# Patient Record
Sex: FEMALE | Race: BLACK OR AFRICAN AMERICAN | Employment: FULL TIME | URBAN - METROPOLITAN AREA
[De-identification: names, ages, dates, MRNs, and addresses within clinical notes are randomized per-mention and may not be internally consistent; named-entity substitution may affect disease eponyms.]

---

## 2020-07-17 ENCOUNTER — APPOINTMENT (OUTPATIENT)
Dept: GENERAL RADIOLOGY | Age: 60
DRG: 291 | End: 2020-07-17
Attending: EMERGENCY MEDICINE
Payer: COMMERCIAL

## 2020-07-17 ENCOUNTER — HOSPITAL ENCOUNTER (INPATIENT)
Age: 60
LOS: 1 days | Discharge: HOME | DRG: 291 | End: 2020-07-18
Attending: EMERGENCY MEDICINE | Admitting: FAMILY MEDICINE
Payer: COMMERCIAL

## 2020-07-17 ENCOUNTER — APPOINTMENT (OUTPATIENT)
Dept: NON INVASIVE DIAGNOSTICS | Age: 60
DRG: 291 | End: 2020-07-17
Attending: FAMILY MEDICINE
Payer: COMMERCIAL

## 2020-07-17 DIAGNOSIS — J96.01 ACUTE RESPIRATORY FAILURE WITH HYPOXIA (HCC): ICD-10-CM

## 2020-07-17 DIAGNOSIS — I50.9 ACUTE ON CHRONIC CONGESTIVE HEART FAILURE, UNSPECIFIED HEART FAILURE TYPE (HCC): Primary | ICD-10-CM

## 2020-07-17 DIAGNOSIS — R06.03 RESPIRATORY DISTRESS, ACUTE: ICD-10-CM

## 2020-07-17 DIAGNOSIS — J81.0 FLASH PULMONARY EDEMA (HCC): ICD-10-CM

## 2020-07-17 DIAGNOSIS — I16.1 HYPERTENSIVE EMERGENCY: ICD-10-CM

## 2020-07-17 PROBLEM — J81.1 PULMONARY EDEMA: Status: ACTIVE | Noted: 2020-07-17

## 2020-07-17 LAB
ADMINISTERED INITIALS, ADMINIT: NORMAL
ALBUMIN SERPL-MCNC: 3 G/DL (ref 3.5–5)
ALBUMIN/GLOB SERPL: 0.6 {RATIO} (ref 1.1–2.2)
ALP SERPL-CCNC: 120 U/L (ref 45–117)
ALT SERPL-CCNC: 24 U/L (ref 12–78)
ANION GAP SERPL CALC-SCNC: 10 MMOL/L (ref 5–15)
ANION GAP SERPL CALC-SCNC: 7 MMOL/L (ref 5–15)
APPEARANCE UR: CLEAR
AST SERPL-CCNC: 24 U/L (ref 15–37)
ATRIAL RATE: 121 BPM
BACTERIA URNS QL MICRO: ABNORMAL /HPF
BASOPHILS # BLD: 0.1 K/UL (ref 0–0.1)
BASOPHILS NFR BLD: 0 % (ref 0–1)
BILIRUB SERPL-MCNC: 0.2 MG/DL (ref 0.2–1)
BILIRUB UR QL: NEGATIVE
BNP SERPL-MCNC: 2600 PG/ML
BUN SERPL-MCNC: 24 MG/DL (ref 6–20)
BUN SERPL-MCNC: 24 MG/DL (ref 6–20)
BUN/CREAT SERPL: 12 (ref 12–20)
BUN/CREAT SERPL: 12 (ref 12–20)
CALCIUM SERPL-MCNC: 8.5 MG/DL (ref 8.5–10.1)
CALCIUM SERPL-MCNC: 8.9 MG/DL (ref 8.5–10.1)
CALCULATED P AXIS, ECG09: 73 DEGREES
CALCULATED R AXIS, ECG10: 56 DEGREES
CALCULATED T AXIS, ECG11: 79 DEGREES
CHLORIDE SERPL-SCNC: 108 MMOL/L (ref 97–108)
CHLORIDE SERPL-SCNC: 108 MMOL/L (ref 97–108)
CK MB CFR SERPL CALC: 0.3 % (ref 0–2.5)
CK MB SERPL-MCNC: 2.2 NG/ML (ref 5–25)
CK SERPL-CCNC: 767 U/L (ref 26–192)
CO2 SERPL-SCNC: 25 MMOL/L (ref 21–32)
CO2 SERPL-SCNC: 25 MMOL/L (ref 21–32)
COLOR UR: ABNORMAL
CREAT SERPL-MCNC: 1.97 MG/DL (ref 0.55–1.02)
CREAT SERPL-MCNC: 1.97 MG/DL (ref 0.55–1.02)
D50 ADMINISTERED, D50ADM: 0 ML
D50 ORDER, D50ORD: 0 ML
DIAGNOSIS, 93000: NORMAL
DIFFERENTIAL METHOD BLD: ABNORMAL
ECHO AV AREA PEAK VELOCITY: 2.34 CM2
ECHO AV AREA/BSA PEAK VELOCITY: 1.1 CM2/M2
ECHO AV PEAK GRADIENT: 4.39 MMHG
ECHO AV PEAK VELOCITY: 104.76 CM/S
ECHO EST RA PRESSURE: 10 MMHG
ECHO LA AREA 4C: 22.45 CM2
ECHO LA MAJOR AXIS: 2.73 CM
ECHO LA MINOR AXIS: 1.29 CM
ECHO LA TO AORTIC ROOT RATIO: 1.75
ECHO LA VOL 4C: 72.45 ML (ref 22–52)
ECHO LA VOLUME INDEX A4C: 34.27 ML/M2 (ref 16–28)
ECHO LV E' LATERAL VELOCITY: 8.19 CM/S
ECHO LV E' SEPTAL VELOCITY: 6.85 CM/S
ECHO LV INTERNAL DIMENSION DIASTOLIC MMODE: 5.56 CM
ECHO LV INTERNAL DIMENSION SYSTOLIC MMODE: 3.43 CM
ECHO LV IVSD MMODE: 1.5 CM
ECHO LV IVSS MMODE: 2.13 CM
ECHO LV POSTERIOR WALL DIASTOLIC MMODE: 1.3 CM
ECHO LV POSTERIOR WALL SYSTOLIC MMODE: 1.87 CM
ECHO LVOT DIAM: 1.98 CM
ECHO LVOT PEAK GRADIENT: 2.54 MMHG
ECHO LVOT PEAK VELOCITY: 79.69 CM/S
ECHO MV A VELOCITY: 78.47 CM/S
ECHO MV E DECELERATION TIME (DT): 0.15 S
ECHO MV E VELOCITY: 106.72 CM/S
ECHO MV E/A RATIO: 1.36
ECHO MV E/E' LATERAL: 13.03
ECHO MV E/E' RATIO (AVERAGED): 14.31
ECHO MV E/E' SEPTAL: 15.58
ECHO PV PEAK INSTANTANEOUS GRADIENT SYSTOLIC: 2.51 MMHG
ECHO PV REGURGITANT MAX VELOCITY: 168.26 CM/S
ECHO PV REGURGITANT MAX VELOCITY: 409.8 CM/S
ECHO PV REGURGITANT MAX VELOCITY: 79.26 CM/S
ECHO RIGHT VENTRICULAR SYSTOLIC PRESSURE (RVSP): 35.27 MMHG
ECHO RV INTERNAL DIMENSION: 2.94 CM
ECHO TV REGURGITANT MAX VELOCITY: 251.21 CM/S
ECHO TV REGURGITANT PEAK GRADIENT: 25.27 MMHG
EOSINOPHIL # BLD: 0.2 K/UL (ref 0–0.4)
EOSINOPHIL NFR BLD: 2 % (ref 0–7)
EPITH CASTS URNS QL MICRO: ABNORMAL /LPF
ERYTHROCYTE [DISTWIDTH] IN BLOOD BY AUTOMATED COUNT: 12.7 % (ref 11.5–14.5)
EST. AVERAGE GLUCOSE BLD GHB EST-MCNC: 177 MG/DL
GLOBULIN SER CALC-MCNC: 5 G/DL (ref 2–4)
GLSCOM COMMENTS: NORMAL
GLUCOSE BLD STRIP.AUTO-MCNC: 138 MG/DL (ref 65–100)
GLUCOSE BLD STRIP.AUTO-MCNC: 214 MG/DL (ref 65–100)
GLUCOSE BLD STRIP.AUTO-MCNC: 254 MG/DL (ref 65–100)
GLUCOSE BLD STRIP.AUTO-MCNC: 487 MG/DL (ref 65–100)
GLUCOSE BLD STRIP.AUTO-MCNC: >600 MG/DL (ref 65–100)
GLUCOSE SERPL-MCNC: 196 MG/DL (ref 65–100)
GLUCOSE SERPL-MCNC: 221 MG/DL (ref 65–100)
GLUCOSE UR STRIP.AUTO-MCNC: 100 MG/DL
GLUCOSE, GLC: 254 MG/DL
HBA1C MFR BLD: 7.8 % (ref 4–5.6)
HCT VFR BLD AUTO: 37.9 % (ref 35–47)
HGB BLD-MCNC: 11.9 G/DL (ref 11.5–16)
HGB UR QL STRIP: ABNORMAL
HIGH TARGET, HITG: 250 MG/DL
HYALINE CASTS URNS QL MICRO: ABNORMAL /LPF (ref 0–5)
IMM GRANULOCYTES # BLD AUTO: 0 K/UL (ref 0–0.04)
IMM GRANULOCYTES NFR BLD AUTO: 0 % (ref 0–0.5)
INSULIN ADMINSTERED, INSADM: 3.9 UNITS/HOUR
INSULIN ORDER, INSORD: 3.9 UNITS/HOUR
KETONES UR QL STRIP.AUTO: NEGATIVE MG/DL
LEUKOCYTE ESTERASE UR QL STRIP.AUTO: NEGATIVE
LOW TARGET, LOT: 150 MG/DL
LYMPHOCYTES # BLD: 4.1 K/UL (ref 0.8–3.5)
LYMPHOCYTES NFR BLD: 36 % (ref 12–49)
MAGNESIUM SERPL-MCNC: 2.2 MG/DL (ref 1.6–2.4)
MCH RBC QN AUTO: 27.7 PG (ref 26–34)
MCHC RBC AUTO-ENTMCNC: 31.4 G/DL (ref 30–36.5)
MCV RBC AUTO: 88.3 FL (ref 80–99)
MINUTES UNTIL NEXT BG, NBG: 60 MIN
MONOCYTES # BLD: 0.5 K/UL (ref 0–1)
MONOCYTES NFR BLD: 4 % (ref 5–13)
MULTIPLIER, MUL: 0.02
NEUTS SEG # BLD: 6.7 K/UL (ref 1.8–8)
NEUTS SEG NFR BLD: 58 % (ref 32–75)
NITRITE UR QL STRIP.AUTO: NEGATIVE
NRBC # BLD: 0 K/UL (ref 0–0.01)
NRBC BLD-RTO: 0 PER 100 WBC
ORDER INITIALS, ORDINIT: NORMAL
P-R INTERVAL, ECG05: 148 MS
PH UR STRIP: 6 [PH] (ref 5–8)
PHOSPHATE SERPL-MCNC: 3.4 MG/DL (ref 2.6–4.7)
PLATELET # BLD AUTO: 302 K/UL (ref 150–400)
PMV BLD AUTO: 10 FL (ref 8.9–12.9)
POTASSIUM SERPL-SCNC: 3.7 MMOL/L (ref 3.5–5.1)
POTASSIUM SERPL-SCNC: 4.3 MMOL/L (ref 3.5–5.1)
PROT SERPL-MCNC: 8 G/DL (ref 6.4–8.2)
PROT UR STRIP-MCNC: 100 MG/DL
Q-T INTERVAL, ECG07: 322 MS
QRS DURATION, ECG06: 82 MS
QTC CALCULATION (BEZET), ECG08: 457 MS
RBC # BLD AUTO: 4.29 M/UL (ref 3.8–5.2)
RBC #/AREA URNS HPF: ABNORMAL /HPF (ref 0–5)
SERVICE CMNT-IMP: ABNORMAL
SODIUM SERPL-SCNC: 140 MMOL/L (ref 136–145)
SODIUM SERPL-SCNC: 143 MMOL/L (ref 136–145)
SP GR UR REFRACTOMETRY: 1.01 (ref 1–1.03)
TROPONIN I SERPL-MCNC: <0.05 NG/ML
TROPONIN I SERPL-MCNC: <0.05 NG/ML
UA: UC IF INDICATED,UAUC: ABNORMAL
UROBILINOGEN UR QL STRIP.AUTO: 0.2 EU/DL (ref 0.2–1)
VENTRICULAR RATE, ECG03: 121 BPM
WBC # BLD AUTO: 11.6 K/UL (ref 3.6–11)
WBC URNS QL MICRO: ABNORMAL /HPF (ref 0–4)

## 2020-07-17 PROCEDURE — 74011000258 HC RX REV CODE- 258: Performed by: FAMILY MEDICINE

## 2020-07-17 PROCEDURE — 93306 TTE W/DOPPLER COMPLETE: CPT

## 2020-07-17 PROCEDURE — 80053 COMPREHEN METABOLIC PANEL: CPT

## 2020-07-17 PROCEDURE — 74011250636 HC RX REV CODE- 250/636: Performed by: FAMILY MEDICINE

## 2020-07-17 PROCEDURE — 99285 EMERGENCY DEPT VISIT HI MDM: CPT

## 2020-07-17 PROCEDURE — 82550 ASSAY OF CK (CPK): CPT

## 2020-07-17 PROCEDURE — 77010033678 HC OXYGEN DAILY

## 2020-07-17 PROCEDURE — 96366 THER/PROPH/DIAG IV INF ADDON: CPT

## 2020-07-17 PROCEDURE — 93005 ELECTROCARDIOGRAM TRACING: CPT

## 2020-07-17 PROCEDURE — 74011000250 HC RX REV CODE- 250: Performed by: EMERGENCY MEDICINE

## 2020-07-17 PROCEDURE — 83735 ASSAY OF MAGNESIUM: CPT

## 2020-07-17 PROCEDURE — 74011636637 HC RX REV CODE- 636/637: Performed by: FAMILY MEDICINE

## 2020-07-17 PROCEDURE — 36415 COLL VENOUS BLD VENIPUNCTURE: CPT

## 2020-07-17 PROCEDURE — 81001 URINALYSIS AUTO W/SCOPE: CPT

## 2020-07-17 PROCEDURE — 74011250637 HC RX REV CODE- 250/637: Performed by: FAMILY MEDICINE

## 2020-07-17 PROCEDURE — 96365 THER/PROPH/DIAG IV INF INIT: CPT

## 2020-07-17 PROCEDURE — 5A09357 ASSISTANCE WITH RESPIRATORY VENTILATION, LESS THAN 24 CONSECUTIVE HOURS, CONTINUOUS POSITIVE AIRWAY PRESSURE: ICD-10-PCS | Performed by: EMERGENCY MEDICINE

## 2020-07-17 PROCEDURE — 82553 CREATINE MB FRACTION: CPT

## 2020-07-17 PROCEDURE — 84100 ASSAY OF PHOSPHORUS: CPT

## 2020-07-17 PROCEDURE — 83880 ASSAY OF NATRIURETIC PEPTIDE: CPT

## 2020-07-17 PROCEDURE — 85025 COMPLETE CBC W/AUTO DIFF WBC: CPT

## 2020-07-17 PROCEDURE — 84484 ASSAY OF TROPONIN QUANT: CPT

## 2020-07-17 PROCEDURE — 82962 GLUCOSE BLOOD TEST: CPT

## 2020-07-17 PROCEDURE — 65660000001 HC RM ICU INTERMED STEPDOWN

## 2020-07-17 PROCEDURE — 74011250636 HC RX REV CODE- 250/636

## 2020-07-17 PROCEDURE — 71045 X-RAY EXAM CHEST 1 VIEW: CPT

## 2020-07-17 PROCEDURE — 83036 HEMOGLOBIN GLYCOSYLATED A1C: CPT

## 2020-07-17 PROCEDURE — 96375 TX/PRO/DX INJ NEW DRUG ADDON: CPT

## 2020-07-17 RX ORDER — HEPARIN SODIUM 5000 [USP'U]/ML
7500 INJECTION, SOLUTION INTRAVENOUS; SUBCUTANEOUS EVERY 8 HOURS
Status: DISCONTINUED | OUTPATIENT
Start: 2020-07-17 | End: 2020-07-18 | Stop reason: HOSPADM

## 2020-07-17 RX ORDER — GLIPIZIDE 5 MG/1
5 TABLET ORAL 2 TIMES DAILY
COMMUNITY
End: 2020-07-17 | Stop reason: DRUGHIGH

## 2020-07-17 RX ORDER — ACETAMINOPHEN 325 MG/1
650 TABLET ORAL
Status: DISCONTINUED | OUTPATIENT
Start: 2020-07-17 | End: 2020-07-18 | Stop reason: HOSPADM

## 2020-07-17 RX ORDER — ACETAMINOPHEN 325 MG/1
650 TABLET ORAL
Status: DISCONTINUED | OUTPATIENT
Start: 2020-07-17 | End: 2020-07-17 | Stop reason: SDUPTHER

## 2020-07-17 RX ORDER — SODIUM CHLORIDE 0.9 % (FLUSH) 0.9 %
5-40 SYRINGE (ML) INJECTION AS NEEDED
Status: DISCONTINUED | OUTPATIENT
Start: 2020-07-17 | End: 2020-07-18 | Stop reason: HOSPADM

## 2020-07-17 RX ORDER — MAGNESIUM SULFATE 100 %
4 CRYSTALS MISCELLANEOUS AS NEEDED
Status: DISCONTINUED | OUTPATIENT
Start: 2020-07-17 | End: 2020-07-17

## 2020-07-17 RX ORDER — FUROSEMIDE 10 MG/ML
40 INJECTION INTRAMUSCULAR; INTRAVENOUS 2 TIMES DAILY
Status: DISCONTINUED | OUTPATIENT
Start: 2020-07-17 | End: 2020-07-17

## 2020-07-17 RX ORDER — ACETAMINOPHEN 325 MG/1
650 TABLET ORAL
Status: DISCONTINUED | OUTPATIENT
Start: 2020-07-17 | End: 2020-07-17

## 2020-07-17 RX ORDER — SODIUM CHLORIDE 0.9 % (FLUSH) 0.9 %
5-40 SYRINGE (ML) INJECTION EVERY 8 HOURS
Status: DISCONTINUED | OUTPATIENT
Start: 2020-07-17 | End: 2020-07-18 | Stop reason: HOSPADM

## 2020-07-17 RX ORDER — LISINOPRIL 5 MG/1
10 TABLET ORAL DAILY
Status: DISCONTINUED | OUTPATIENT
Start: 2020-07-17 | End: 2020-07-18 | Stop reason: HOSPADM

## 2020-07-17 RX ORDER — DEXTROSE 50 % IN WATER (D50W) INTRAVENOUS SYRINGE
25-50 AS NEEDED
Status: DISCONTINUED | OUTPATIENT
Start: 2020-07-17 | End: 2020-07-17 | Stop reason: SDUPTHER

## 2020-07-17 RX ORDER — GLIPIZIDE 10 MG/1
10 TABLET ORAL 2 TIMES DAILY
COMMUNITY

## 2020-07-17 RX ORDER — INSULIN LISPRO 100 [IU]/ML
INJECTION, SOLUTION INTRAVENOUS; SUBCUTANEOUS
Status: DISCONTINUED | OUTPATIENT
Start: 2020-07-17 | End: 2020-07-17

## 2020-07-17 RX ORDER — MAGNESIUM SULFATE 100 %
4 CRYSTALS MISCELLANEOUS AS NEEDED
Status: DISCONTINUED | OUTPATIENT
Start: 2020-07-17 | End: 2020-07-17 | Stop reason: SDUPTHER

## 2020-07-17 RX ORDER — FUROSEMIDE 10 MG/ML
40 INJECTION INTRAMUSCULAR; INTRAVENOUS
Status: COMPLETED | OUTPATIENT
Start: 2020-07-17 | End: 2020-07-17

## 2020-07-17 RX ORDER — DEXTROSE 50 % IN WATER (D50W) INTRAVENOUS SYRINGE
25-50 AS NEEDED
Status: DISCONTINUED | OUTPATIENT
Start: 2020-07-17 | End: 2020-07-17

## 2020-07-17 RX ORDER — FUROSEMIDE 10 MG/ML
INJECTION INTRAMUSCULAR; INTRAVENOUS
Status: COMPLETED
Start: 2020-07-17 | End: 2020-07-17

## 2020-07-17 RX ORDER — NITROGLYCERIN 20 MG/100ML
0-200 INJECTION INTRAVENOUS
Status: DISCONTINUED | OUTPATIENT
Start: 2020-07-17 | End: 2020-07-17

## 2020-07-17 RX ORDER — SPIRONOLACTONE 25 MG/1
25 TABLET ORAL DAILY
COMMUNITY

## 2020-07-17 RX ORDER — INSULIN LISPRO 100 [IU]/ML
INJECTION, SOLUTION INTRAVENOUS; SUBCUTANEOUS
Status: DISCONTINUED | OUTPATIENT
Start: 2020-07-17 | End: 2020-07-18 | Stop reason: HOSPADM

## 2020-07-17 RX ORDER — CARVEDILOL 6.25 MG/1
6.25 TABLET ORAL 2 TIMES DAILY WITH MEALS
COMMUNITY

## 2020-07-17 RX ORDER — LISINOPRIL 40 MG/1
40 TABLET ORAL DAILY
COMMUNITY

## 2020-07-17 RX ORDER — CARVEDILOL 12.5 MG/1
25 TABLET ORAL 2 TIMES DAILY WITH MEALS
Status: DISCONTINUED | OUTPATIENT
Start: 2020-07-17 | End: 2020-07-18 | Stop reason: HOSPADM

## 2020-07-17 RX ORDER — SPIRONOLACTONE 25 MG/1
25 TABLET ORAL DAILY
Status: DISCONTINUED | OUTPATIENT
Start: 2020-07-17 | End: 2020-07-18 | Stop reason: HOSPADM

## 2020-07-17 RX ORDER — FUROSEMIDE 10 MG/ML
40 INJECTION INTRAMUSCULAR; INTRAVENOUS 2 TIMES DAILY
Status: DISCONTINUED | OUTPATIENT
Start: 2020-07-17 | End: 2020-07-18 | Stop reason: HOSPADM

## 2020-07-17 RX ORDER — FUROSEMIDE 40 MG/1
40 TABLET ORAL DAILY
COMMUNITY

## 2020-07-17 RX ADMIN — Medication 10 ML: at 21:06

## 2020-07-17 RX ADMIN — FUROSEMIDE 40 MG: 10 INJECTION INTRAMUSCULAR; INTRAVENOUS at 05:05

## 2020-07-17 RX ADMIN — Medication 10 ML: at 17:29

## 2020-07-17 RX ADMIN — FUROSEMIDE 40 MG: 10 INJECTION, SOLUTION INTRAMUSCULAR; INTRAVENOUS at 17:29

## 2020-07-17 RX ADMIN — CARVEDILOL 25 MG: 12.5 TABLET, FILM COATED ORAL at 21:06

## 2020-07-17 RX ADMIN — NITROGLYCERIN 60 MCG/MIN: 20 INJECTION INTRAVENOUS at 05:06

## 2020-07-17 RX ADMIN — ACETAMINOPHEN 650 MG: 325 TABLET ORAL at 12:02

## 2020-07-17 RX ADMIN — FUROSEMIDE 40 MG: 10 INJECTION, SOLUTION INTRAMUSCULAR; INTRAVENOUS at 05:05

## 2020-07-17 RX ADMIN — Medication 10 ML: at 07:52

## 2020-07-17 RX ADMIN — INSULIN LISPRO 4 UNITS: 100 INJECTION, SOLUTION INTRAVENOUS; SUBCUTANEOUS at 13:53

## 2020-07-17 RX ADMIN — CARVEDILOL 25 MG: 12.5 TABLET, FILM COATED ORAL at 09:04

## 2020-07-17 RX ADMIN — SPIRONOLACTONE 25 MG: 25 TABLET ORAL at 08:54

## 2020-07-17 RX ADMIN — HEPARIN SODIUM 7500 UNITS: 5000 INJECTION INTRAVENOUS; SUBCUTANEOUS at 09:04

## 2020-07-17 RX ADMIN — INSULIN LISPRO 3 UNITS: 100 INJECTION, SOLUTION INTRAVENOUS; SUBCUTANEOUS at 17:28

## 2020-07-17 NOTE — CARDIO/PULMONARY
Cardiac Rehab Note: chart review     Consult has been acknowledged     SOB    Echo ordered    Smoking history assessed. Patient is a non smoker. Smoking Cessation Program link has not been added to the AVS.     Cardiology consult ordered. CP Rehab will follow.

## 2020-07-17 NOTE — ED NOTES
Holding on starting insulin drip d/t BG being 254. Will call MD. Pt did receive 4 units SQ for lunch coverage.

## 2020-07-17 NOTE — ED NOTES
MD Rojelio Aleman aware that the pts Nitro drip is held d/t SBP of 136. Will continue to monitor SBP.

## 2020-07-17 NOTE — ED NOTES
TRANSFER - OUT REPORT:    Verbal report given to Niki Flores RN (name) on Silvana Portillo  being transferred to [address] [place] [address] Rd [phone] (unit) for routine progression of care       Report consisted of patients Situation, Background, Assessment and   Recommendations(SBAR). Information from the following report(s) SBAR, Kardex and ED Summary was reviewed with the receiving nurse. Lines:   Peripheral IV 07/17/20 Right Antecubital (Active)   Site Assessment Clean, dry, & intact 07/17/20 0504   Phlebitis Assessment 0 07/17/20 0504   Infiltration Assessment 0 07/17/20 0504   Dressing Status Clean, dry, & intact 07/17/20 0504   Dressing Type Transparent 07/17/20 0504   Hub Color/Line Status Pink 07/17/20 0504   Alcohol Cap Used No 07/17/20 0504       Peripheral IV 07/17/20 Right Hand (Active)   Site Assessment Clean, dry, & intact 07/17/20 1323   Phlebitis Assessment 0 07/17/20 1323   Infiltration Assessment 0 07/17/20 1323   Dressing Status Clean, dry, & intact 07/17/20 1323   Dressing Type Tape;Transparent 07/17/20 1323   Hub Color/Line Status Pink;Patent 07/17/20 1323        Opportunity for questions and clarification was provided. Patient transported with:   Registered Nurse, cardiac monitoring, and oxygen 2L via NC. Receiving RN is aware that the pt is AC&HS, and that insulin drip order set has been discontinued.  Receiving RN also aware that Nitro drip is PRN for SBP >160 per MD Szymanski's request.

## 2020-07-17 NOTE — PROGRESS NOTES
Primary Nurse Ike Enriquez, CARMELITA and Deysi RN performed a dual skin assessment on this patient No impairment noted  Bert score is 23

## 2020-07-17 NOTE — ED PROVIDER NOTES
EMERGENCY DEPARTMENT HISTORY AND PHYSICAL EXAM      Date: 7/17/2020  Patient Name: Mack Wakefield    History of Presenting Illness     Chief Complaint   Patient presents with    Shortness of Breath     Pt CC of SOB starting yesterday. Pt has wet cough and has hx of CHF. Pt also reports CP starting tonight. Pt denies being around anyone who has been sick and fevers at home. History Provided By: Patient    HPI: Mack Wakefield, 63 y.o. female with past medical history of heart failure presenting today with severe shortness of breath. The patient notes that she started having shortness of breath yesterday, and this increased in severity overnight. She states that she does have a history of CHF and is compliant with medications including Lasix. She has had a wet and nonproductive cough. No fevers or chills. She does note lower extremity swelling that has recently increased. She denies any chest pain at this time. She notes that she did have to be admitted for CHF when she was initially diagnosed. There are no other complaints, changes, or physical findings at this time. PCP: Other, MD Michael    No current facility-administered medications on file prior to encounter. No current outpatient medications on file prior to encounter. Past History     Past Medical History:  No past medical history on file. CHF    Past Surgical History:  No past surgical history on file. Family History:  No family history on file. Social History:  Social History     Tobacco Use    Smoking status: Not on file   Substance Use Topics    Alcohol use: Not on file    Drug use: Not on file   Denies alcohol use.     Allergies:  No Known Allergies      Review of Systems   Constitutional: No  fever  Skin: No  rash  HEENT: No  nasal congestion  Resp: No cough, + severe dyspnea  CV: No chest pain  GI: No vomiting  : No dysuria  MSK: No joint pain  Neuro: No numbness  Psych: No suicidal      Physical Exam Patient Vitals for the past 12 hrs:   Temp Pulse Resp BP SpO2   07/17/20 0640  (!) 113 22 (!) 166/96 100 %   07/17/20 0630  (!) 110 19 (!) 144/110 100 %   07/17/20 0620  (!) 111 21 (!) 155/99 100 %   07/17/20 0610  (!) 114 21 (!) 180/99 100 %   07/17/20 0600  (!) 115 23 179/80 100 %   07/17/20 0504     93 %   07/17/20 0502  (!) 120 27 (!) 231/109 94 %   07/17/20 0446 98 °F (36.7 °C) (!) 127 24 (!) 218/120 (!) 80 %     General: alert, severe distress, diaphoretic  Eyes: EOMI, normal conjunctiva  ENT: moist mucous membranes. Neck: Active, full ROM of neck. Skin: No rashes. no jaundice              Lungs: Equal chest expansion. no respiratory distress. Crackles using supraclavicular accessory muscles and using intercostal accessory muscles  Heart: tachycardic with a  regular rhythm     2+ pitting edema bilaterally   2+ radial pulses and DPs bilaterally  Abd:  non distended soft, nontender. No rebound tenderness. No guarding  Back: Full ROM  MSK: Full, active ROM in all 4 extremities.    Neuro: Person, Place, Time and Situation; normal speech;   Psych: Cooperative with exam; Appropriate mood and affect             Diagnostic Study Results     Labs -     Recent Results (from the past 12 hour(s))   EKG, 12 LEAD, INITIAL    Collection Time: 07/17/20  4:51 AM   Result Value Ref Range    Ventricular Rate 121 BPM    Atrial Rate 121 BPM    P-R Interval 148 ms    QRS Duration 82 ms    Q-T Interval 322 ms    QTC Calculation (Bezet) 457 ms    Calculated P Axis 73 degrees    Calculated R Axis 56 degrees    Calculated T Axis 79 degrees    Diagnosis       Sinus tachycardia  Possible Left atrial enlargement  No previous ECGs available     CBC WITH AUTOMATED DIFF    Collection Time: 07/17/20  5:00 AM   Result Value Ref Range    WBC 11.6 (H) 3.6 - 11.0 K/uL    RBC 4.29 3.80 - 5.20 M/uL    HGB 11.9 11.5 - 16.0 g/dL    HCT 37.9 35.0 - 47.0 %    MCV 88.3 80.0 - 99.0 FL    MCH 27.7 26.0 - 34.0 PG    MCHC 31.4 30.0 - 36.5 g/dL RDW 12.7 11.5 - 14.5 %    PLATELET 792 592 - 585 K/uL    MPV 10.0 8.9 - 12.9 FL    NRBC 0.0 0  WBC    ABSOLUTE NRBC 0.00 0.00 - 0.01 K/uL    NEUTROPHILS 58 32 - 75 %    LYMPHOCYTES 36 12 - 49 %    MONOCYTES 4 (L) 5 - 13 %    EOSINOPHILS 2 0 - 7 %    BASOPHILS 0 0 - 1 %    IMMATURE GRANULOCYTES 0 0.0 - 0.5 %    ABS. NEUTROPHILS 6.7 1.8 - 8.0 K/UL    ABS. LYMPHOCYTES 4.1 (H) 0.8 - 3.5 K/UL    ABS. MONOCYTES 0.5 0.0 - 1.0 K/UL    ABS. EOSINOPHILS 0.2 0.0 - 0.4 K/UL    ABS. BASOPHILS 0.1 0.0 - 0.1 K/UL    ABS. IMM. GRANS. 0.0 0.00 - 0.04 K/UL    DF AUTOMATED     METABOLIC PANEL, COMPREHENSIVE    Collection Time: 07/17/20  5:00 AM   Result Value Ref Range    Sodium 143 136 - 145 mmol/L    Potassium 3.7 3.5 - 5.1 mmol/L    Chloride 108 97 - 108 mmol/L    CO2 25 21 - 32 mmol/L    Anion gap 10 5 - 15 mmol/L    Glucose 221 (H) 65 - 100 mg/dL    BUN 24 (H) 6 - 20 MG/DL    Creatinine 1.97 (H) 0.55 - 1.02 MG/DL    BUN/Creatinine ratio 12 12 - 20      GFR est AA 32 (L) >60 ml/min/1.73m2    GFR est non-AA 26 (L) >60 ml/min/1.73m2    Calcium 8.9 8.5 - 10.1 MG/DL    Bilirubin, total 0.2 0.2 - 1.0 MG/DL    ALT (SGPT) 24 12 - 78 U/L    AST (SGOT) 24 15 - 37 U/L    Alk. phosphatase 120 (H) 45 - 117 U/L    Protein, total 8.0 6.4 - 8.2 g/dL    Albumin 3.0 (L) 3.5 - 5.0 g/dL    Globulin 5.0 (H) 2.0 - 4.0 g/dL    A-G Ratio 0.6 (L) 1.1 - 2.2     CK W/ REFLX CKMB    Collection Time: 07/17/20  5:00 AM   Result Value Ref Range     (H) 26 - 192 U/L   TROPONIN I    Collection Time: 07/17/20  5:00 AM   Result Value Ref Range    Troponin-I, Qt. <0.05 <0.05 ng/mL   NT-PRO BNP    Collection Time: 07/17/20  5:00 AM   Result Value Ref Range    NT pro-BNP 2,600 (H) <125 PG/ML   CK-MB,QUANT.     Collection Time: 07/17/20  5:00 AM   Result Value Ref Range    CK - MB 2.2 <3.6 NG/ML    CK-MB Index 0.3 0.0 - 2.5         Radiologic Studies -   XR CHEST PORT   Final Result   IMPRESSION:   No acute process allowing for respiratory equipment and jewelry overlying   portions of the lungs as above. CT Results  (Last 48 hours)    None        CXR Results  (Last 48 hours)               07/17/20 0532  XR CHEST PORT Final result    Impression:  IMPRESSION:   No acute process allowing for respiratory equipment and jewelry overlying   portions of the lungs as above. Narrative:  INDICATION: chest pain       EXAM:  AP CHEST RADIOGRAPH       COMPARISON: None       FINDINGS:       AP portable view of the chest demonstrates a normal cardiomediastinal   silhouette. Respiratory \"benign overlies the left hemithorax. No focal airspace   disease. No definite pulmonary edema or pneumothorax though patient's jewelry   and respiratory clip and also partly obscure the lung apices. The osseous   structures are unremarkable. Medical Decision Making   I am the first provider for this patient. I reviewed the vital signs, available nursing notes, past medical history, past surgical history, family history and social history. Records Reviewed: No records available for review    Vital Signs-Reviewed the patient's vital signs. Patient Vitals for the past 12 hrs:   Temp Pulse Resp BP SpO2   07/17/20 0640  (!) 113 22 (!) 166/96 100 %   07/17/20 0630  (!) 110 19 (!) 144/110 100 %   07/17/20 0620  (!) 111 21 (!) 155/99 100 %   07/17/20 0610  (!) 114 21 (!) 180/99 100 %   07/17/20 0600  (!) 115 23 179/80 100 %   07/17/20 0504     93 %   07/17/20 0502  (!) 120 27 (!) 231/109 94 %   07/17/20 0446 98 °F (36.7 °C) (!) 127 24 (!) 218/120 (!) 80 %       Pulse Oximetry Analysis - 92% on 6 liters/min via nasal prongs    Cardiac Monitor:   Rate: 127 bpm  Rhythm: Normal Sinus Rhythm   Ordered to monitor for arrhythmia given severe dyspnea. Provider Notes (Medical Decision Making):     Differential Diagnosis: Flash pulmonary edema, hypertensive emergency, ACS, electrolyte derangement, pneumonia, pneumothorax    Initial Plan:  We will place patient on BiPAP, order nitro drip and Lasix. The patient has pitting edema to her knees bilaterally, she is hypoxic on room air, diaphoretic, and in respiratory distress. She does have a history of CHF, but is unable to provide much further history given her severe dyspnea at this time. ED Course:   Initial assessment performed. The patients presenting problems have been discussed, and they are in agreement with the care plan formulated and outlined with them. I have encouraged them to ask questions as they arise throughout their visit. ED Course as of Jul 17 0646 Fri Jul 17, 2020   0509 Patient initiated on BiPAP, respiratory effort is improved, tachypnea has decreased. [NW]   [id number] On my interpretation of the patient's EKG sinus tachycardia at a rate of 121, QTC is 457, no ST elevation or depression.    [NW]   0639 On my interpretation the patient's chest x-ray no evidence of infiltrate.    [NW]   0558 On my interpretation of the patient's laboratory studies she has an elevated cardiac BNP, white blood count is minimally elevated. Creatinine 1.97 with unclear baseline.    [NW]   [id number] Patient presents today with severe dyspnea, diaphoresis, tachycardic with crackles throughout her lungs. She also has pitting edema. She does have a history of CHF. Cardiac BNP is elevated, troponin negative, no ischemic changes on EKG. The patient was initiated on BiPAP, nitroglycerin and this was titrated up to 100 mics per minute. With this and the Lasix the patient's respiratory effort improved on BiPAP. Ultimately will require admission for pulmonary edema and hypertensive emergency. [NW]      ED Course User Index  [NW] Edmond Powell MD       I, Hasmukh Chu MD, am the attending of record for this patient encounter.     Procedure:  CRITICAL CARE NOTE :    4:59 AM    IMPENDING DETERIORATION -Airway, Respiratory and Cardiovascular  ASSOCIATED RISK FACTORS - Hypoxia, Metabolic changes and Vascular Compromise  MANAGEMENT- Bedside Assessment and Supervision of Care  INTERPRETATION -  Xrays, ECG and Blood Pressure  INTERVENTIONS - hemodynamic mngmt and vent mngmt  CASE REVIEW - Hospitalist, Nursing and Family  TREATMENT RESPONSE -Improved  PERFORMED BY - Self    NOTES   :    I have spent 40 minutes of critical care time involved in lab review, consultations with specialist, family decision- making, bedside attention and documentation. This time does not include time spent on separately reported billable procedures. During this entire length of time I was immediately available to the patient . Disposition: Admitted    Admission Note:  Patient is being admitted to the hospital by Service: Hospitalist.  The results of their tests and reasons for their admission have been discussed with them and available family. They convey agreement and understanding for the need to be admitted and for their admission diagnosis. Diagnosis     Clinical Impression:   1. Acute on chronic congestive heart failure, unspecified heart failure type ([address]    2. Flash pulmonary edema (HCC)    3. Hypertensive emergency    4. Acute respiratory failure with hypoxia (HCC)    5. Respiratory distress, acute        Attestations:    Sam Carlson MD    Please note that this dictation was completed with [employer], the [employer] voice recognition software. Quite often unanticipated grammatical, syntax, homophones, and other interpretive errors are inadvertently transcribed by the computer software. Please disregard these errors. Please excuse any errors that have escaped final proofreading. Thank you.

## 2020-07-17 NOTE — ED NOTES
Md Aaron Parish states to hold insulin drip and insulin drip order set. MD Aaron Parish states to call for orders if pts BG >500, for now MD states to remain medication regimen of SC AC&HS insulin injections via sliding scale.

## 2020-07-17 NOTE — ED NOTES
Insulin drip has yet to arrive to ER will send message to Pharmacy to send drip to [facility].

## 2020-07-17 NOTE — CONSULTS
Consult/Admission    NAME: Yuri Spencer   :  1960   MRN:  [id number]     Date/Time:  2020 1:36 PM    Patient PCP: Adela Goncalves MD  ________________________________________________________________________     Assessment:      Acute respiratory failure. Hypertension uncontrolled. Hypertensive heart disease. Reported Cardiomyopathy  Minimally elevated pro BNP   Acute  /  CKD stage 3   Diabetes type 2. She is a  in [place]. Plan:     Diurese   Resume HTN meds. Will review Echo when available. Would expect that with resuming her meds,  BP and sxs should come under control and patient could discharge to f/u with her cardiologist in [place]. [x]           High complexity decision making was performed        Subjective:     CHIEF COMPLAINT: Shortness of breath     HISTORY OF PRESENT ILLNESS:     Vega Tirado is a 63 y.o.  female who presents with acute onset shortness of breath. Patient states diagnosed with congestive heart failure, in 2019. His cardiologist in [facility], [place]. She was taking spironolactone, Lasix, carvedilol. Last week decided to come to visit her son did not bring her medications. She was rattling for last 2 3 days. Early this morning woke up to take a shower and drive back to [place]. Became severely short of breath. Her son drove her here. Patient denies contact with COVID positive patient. Denies fever or chills. No chest pain     ER course: Upon arrival patient hypertensive, hypoxic respiratory failure. Placed on BiPAP, Lasix given started on NTG drip, significant improvement, hospitalist consulted for admission. Labs showed acute kidney injury, hyperglycemia, elevated proBNP negative troponin. CXR does not show pulmonary edema .            Past Medical History:   Diagnosis Date    Diabetes ([facility]     Heart failure ([facility]     Hypertension       Past Surgical History:   Procedure Laterality Date    HX UROLOGICAL      kidney stents placed in early 2000's     No Known Allergies   Meds:  See below  Social History     Tobacco Use    Smoking status: Never Smoker    Smokeless tobacco: Never Used   Substance Use Topics    Alcohol use: Never     Frequency: Never      History reviewed. No pertinent family history. REVIEW OF SYSTEMS:     []            Unable to obtain  ROS due to ---   [x]            Total of 12 systems reviewed as follows:    Constitutional: negative fever, negative chills, negative weight loss  Eyes:   negative visual changes  ENT:   negative sore throat, tongue or lip swelling  Respiratory:  negative cough, negative dyspnea  Cards:  negative for chest pain, palpitations, lower extremity edema  GI:   negative for nausea, vomiting, diarrhea, and abdominal pain  Genitourinary: negative for frequency, dysuria  Integument:  negative for rash   Hematologic:  negative for easy bruising and gum/nose bleeding  Musculoskel: negative for myalgias,  back pain  Neurological:  negative for headaches, dizziness, vertigo, weakness  Behavl/Psych: negative for feelings of anxiety, depression     Pertinent Positives include :    Objective:      Physical Exam:    Last 24hrs VS reviewed since prior progress note. Most recent are:    Visit Vitals  /72   Pulse 82   Temp 98.5 °F (36.9 °C)   Resp 26   Ht 5' 4\" (1.626 m)   Wt 108.9 kg (240 lb)   SpO2 99%   Breastfeeding No   BMI 41.20 kg/m²       Intake/Output Summary (Last 24 hours) at 7/17/2020 1336  Last data filed at 7/17/2020 0854  Gross per 24 hour   Intake    Output 600 ml   Net -600 ml        General Appearance: Well developed, well nourished, alert & oriented x 3,    no acute distress. Ears/Nose/Mouth/Throat: Pupils equal and round, Hearing grossly normal.  Neck: Supple. JVP within normal limits. Carotids good upstrokes, with no bruit. Chest: Lungs clear to auscultation bilaterally.   Cardiovascular: Regular rate and rhythm, S1S2 normal, no murmur, rubs, gallops. Abdomen: Soft, non-tender, bowel sounds are active. No organomegaly. Extremities: No edema bilaterally. Femoral pulses +2, Distal Pulses +1. Skin: Warm and dry. Neuro: CN II-XII grossly intact, Strength and sensation grossly intact. Data:      Prior to Admission medications    Medication Sig Start Date End Date Taking? Authorizing Provider   lisinopriL (PRINIVIL, ZESTRIL) 40 mg tablet Take 40 mg by mouth daily. Yes Other, MD Michael   carvediloL (COREG) 6.25 mg tablet Take 6.25 mg by mouth two (2) times daily (with meals). Yes Provider, Historical   furosemide (LASIX) 40 mg tablet Take 40 mg by mouth daily. Yes Provider, Historical   glipiZIDE (GLUCOTROL) 10 mg tablet Take 10 mg by mouth two (2) times a day. Yes Provider, Historical   spironolactone (ALDACTONE) 25 mg tablet Take 25 mg by mouth daily.    Yes Provider, Historical       Recent Results (from the past 24 hour(s))   EKG, 12 LEAD, INITIAL    Collection Time: 07/17/20  4:51 AM   Result Value Ref Range    Ventricular Rate 121 BPM    Atrial Rate 121 BPM    P-R Interval 148 ms    QRS Duration 82 ms    Q-T Interval 322 ms    QTC Calculation (Bezet) 457 ms    Calculated P Axis 73 degrees    Calculated R Axis 56 degrees    Calculated T Axis 79 degrees    Diagnosis       Sinus tachycardia  Possible Left atrial enlargement  No previous ECGs available  Confirmed by Konrad Litten MD, Jim Ferreira ([id number]) on 7/17/2020 10:46:01 AM     CBC WITH AUTOMATED DIFF    Collection Time: 07/17/20  5:00 AM   Result Value Ref Range    WBC 11.6 (H) 3.6 - 11.0 K/uL    RBC 4.29 3.80 - 5.20 M/uL    HGB 11.9 11.5 - 16.0 g/dL    HCT 37.9 35.0 - 47.0 %    MCV 88.3 80.0 - 99.0 FL    MCH 27.7 26.0 - 34.0 PG    MCHC 31.4 30.0 - 36.5 g/dL    RDW 12.7 11.5 - 14.5 %    PLATELET 482 497 - 643 K/uL    MPV 10.0 8.9 - 12.9 FL    NRBC 0.0 0  WBC    ABSOLUTE NRBC 0.00 0.00 - 0.01 K/uL    NEUTROPHILS 58 32 - 75 %    LYMPHOCYTES 36 12 - 49 %    MONOCYTES 4 (L) 5 - 13 % EOSINOPHILS 2 0 - 7 %    BASOPHILS 0 0 - 1 %    IMMATURE GRANULOCYTES 0 0.0 - 0.5 %    ABS. NEUTROPHILS 6.7 1.8 - 8.0 K/UL    ABS. LYMPHOCYTES 4.1 (H) 0.8 - 3.5 K/UL    ABS. MONOCYTES 0.5 0.0 - 1.0 K/UL    ABS. EOSINOPHILS 0.2 0.0 - 0.4 K/UL    ABS. BASOPHILS 0.1 0.0 - 0.1 K/UL    ABS. IMM. GRANS. 0.0 0.00 - 0.04 K/UL    DF AUTOMATED     METABOLIC PANEL, COMPREHENSIVE    Collection Time: 07/17/20  5:00 AM   Result Value Ref Range    Sodium 143 136 - 145 mmol/L    Potassium 3.7 3.5 - 5.1 mmol/L    Chloride 108 97 - 108 mmol/L    CO2 25 21 - 32 mmol/L    Anion gap 10 5 - 15 mmol/L    Glucose 221 (H) 65 - 100 mg/dL    BUN 24 (H) 6 - 20 MG/DL    Creatinine 1.97 (H) 0.55 - 1.02 MG/DL    BUN/Creatinine ratio 12 12 - 20      GFR est AA 32 (L) >60 ml/min/1.73m2    GFR est non-AA 26 (L) >60 ml/min/1.73m2    Calcium 8.9 8.5 - 10.1 MG/DL    Bilirubin, total 0.2 0.2 - 1.0 MG/DL    ALT (SGPT) 24 12 - 78 U/L    AST (SGOT) 24 15 - 37 U/L    Alk. phosphatase 120 (H) 45 - 117 U/L    Protein, total 8.0 6.4 - 8.2 g/dL    Albumin 3.0 (L) 3.5 - 5.0 g/dL    Globulin 5.0 (H) 2.0 - 4.0 g/dL    A-G Ratio 0.6 (L) 1.1 - 2.2     CK W/ REFLX CKMB    Collection Time: 07/17/20  5:00 AM   Result Value Ref Range     (H) 26 - 192 U/L   TROPONIN I    Collection Time: 07/17/20  5:00 AM   Result Value Ref Range    Troponin-I, Qt. <0.05 <0.05 ng/mL   NT-PRO BNP    Collection Time: 07/17/20  5:00 AM   Result Value Ref Range    NT pro-BNP 2,600 (H) <125 PG/ML   CK-MB,QUANT.     Collection Time: 07/17/20  5:00 AM   Result Value Ref Range    CK - MB 2.2 <3.6 NG/ML    CK-MB Index 0.3 0.0 - 2.5     GLUCOSE, POC    Collection Time: 07/17/20 12:04 PM   Result Value Ref Range    Glucose (POC) >600 (HH) 65 - 100 mg/dL    Performed by Catherine Elliott RN    GLUCOSE, POC    Collection Time: 07/17/20 12:05 PM   Result Value Ref Range    Glucose (POC) 487 (H) 65 - 100 mg/dL    Performed by Catherine Elliott RN    TROPONIN I    Collection Time: 07/17/20 12:08 PM   Result Value Ref Range    Troponin-I, Qt. <0.05 <0.05 ng/mL   URINALYSIS W/ REFLEX CULTURE    Collection Time: 07/17/20 12:08 PM    Specimen: Urine   Result Value Ref Range    Color YELLOW/STRAW      Appearance CLEAR CLEAR      Specific gravity 1.008 1.003 - 1.030      pH (UA) 6.0 5.0 - 8.0      Protein 100 (A) NEG mg/dL    Glucose 100 (A) NEG mg/dL    Ketone Negative NEG mg/dL    Bilirubin Negative NEG      Blood MODERATE (A) NEG      Urobilinogen 0.2 0.2 - 1.0 EU/dL    Nitrites Negative NEG      Leukocyte Esterase Negative NEG      WBC 0-4 0 - 4 /hpf    RBC 10-20 0 - 5 /hpf    Epithelial cells FEW FEW /lpf    Bacteria 2+ (A) NEG /hpf    UA:UC IF INDICATED CULTURE NOT INDICATED BY UA RESULT CNI      Hyaline cast 0-2 0 - 5 /lpf

## 2020-07-17 NOTE — PROGRESS NOTES
Pharmacy - Heparin Dose Adjustment    Indication: VTE prophylaxis     Current Dose: Heparin 5000 units subcutaneously every 8 hours    Weight Ht Readings from Last 1 Encounters:   07/17/20 162.6 cm (64\")      Height Wt Readings from Last 1 Encounters:   07/17/20 108.9 kg (240 lb)      BMI Body mass index is 41.2 kg/m².        Hgb Recent Labs     07/17/20  0500   HGB 11.9      Platelets Recent Labs     07/17/20  0500           Impression/Plan:   Change to  Heparin 7500 units subcutaneously every 8 hours per protocol for obese patients with BMI >40     Michelle,  Marge Franklin, PharmD

## 2020-07-17 NOTE — PROGRESS NOTES
Pharmacy Medication Reconciliation     The patient was interviewed regarding current PTA medication list, use and drug allergies;  patient present in room and obtained permission from patient to discuss drug regimen with visitor(s) present. The patient was questioned regarding use of any other inhalers, topical products, over the counter medications, herbal medications, vitamin products or ophthalmic/nasal/otic medication use. Allergy Update: Patient has no known allergies. Recommendations/Findings: The following amendments were made to the patient's active medication list on file at [address]:   1) Additions: all meds below    2) Deletions: None    3) Changes: None    Pertinent Findings:   -patient is not taking jardiance due to UTI, she states she is taking glipizide 10mg bid (but last filled in October 2019)    -Clarified PTA med list with patient interview, Outpatient pharmacy, rx query (no results). PTA medication list was corrected to the following:     Prior to Admission Medications   Prescriptions Last Dose Informant Taking?   carvediloL (COREG) 6.25 mg tablet 7/10/2020 at Unknown time  Yes   Sig: Take 6.25 mg by mouth two (2) times daily (with meals). furosemide (LASIX) 40 mg tablet 7/10/2020 at Unknown time  Yes   Sig: Take 40 mg by mouth daily. glipiZIDE (GLUCOTROL) 10 mg tablet 7/10/2020 at Unknown time  Yes   Sig: Take 10 mg by mouth two (2) times a day. lisinopriL (PRINIVIL, ZESTRIL) 40 mg tablet 7/10/2020 at Unknown time Other Yes   Sig: Take 40 mg by mouth daily. spironolactone (ALDACTONE) 25 mg tablet 7/10/2020 at Unknown time  Yes   Sig: Take 25 mg by mouth daily.       Facility-Administered Medications: None          Thank you,  AGUSTIN Keith

## 2020-07-17 NOTE — PROGRESS NOTES
- received report from [facility], [address] [facility] in ED.   8280 - patient on unit from ED. VSS. Alert. Oriented x 4. PIV.   1915 - report given to Amauri Mcgraw RN.

## 2020-07-17 NOTE — H&P
Hospitalist Admission Note    NAME: Brown Lam   :  1960   MRN:  [id number]     Date/Time:  2020 7:53 AM    Patient PCP: Michael Martel MD  ______________________________________________________________________  Given the patient's current clinical presentation, I have a high level of concern for decompensation if discharged from the emergency department. Complex decision making was performed, which includes reviewing the patient's available past medical records, laboratory results, and x-ray films. My assessment of this patient's clinical condition and my plan of care is as follows. Assessment / Plan:  Patient 52-year-old female admitted for acute hypoxic respiratory failure due to congestive heart failure with acute exacerbation. 1.  Acute hypoxic respiratory failure; patient with history of nonischemic cardiomyopathy, did not have access to her medication for last several days. Upon arrival hypoxic hypertensive, required BiPAP, given Lasix, more stable at this time. Will continue nitroglycerin drip, IV Lasix 40 mg twice daily, beta-blocker, echocardiogram, cardiology consultation. Admit to stepdown. 2.  Diabetes with hyperglycemia: Sliding scale insulin  3. Hypertension: Patient hypertensive, resume home medication        Code Status: Full code  Surrogate Decision Maker:    DVT Prophylaxis: Heparin  GI Prophylaxis: not indicated    Baseline: Independent ADLs      Subjective:   CHIEF COMPLAINT: Shortness of breath    HISTORY OF PRESENT ILLNESS:     Kimberlee Lozoya is a 63 y.o.  female who presents with acute onset shortness of breath. Patient states diagnosed with congestive heart failure, in 2019. His cardiologist in [facility]. She was taking spironolactone, Lasix, carvedilol. Last week decided to come to visit her son did not bring her medications. She was rattling for last 2 3 days.   Early this morning woke up to take a shower and drive back to [place]. Became severely short of breath. Her son drove her here. Patient denies contact with COVID positive patient. Denies fever or chills. No chest pain    ER course: Upon arrival patient hypertensive, hypoxic respiratory failure. Placed on BiPAP, Lasix given started on NTG drip, significant improvement, hospitalist consulted for admission. Labs showed acute kidney injury, hyperglycemia, elevated proBNP negative troponin. We were asked to admit for work up and evaluation of the above problems. No past medical history on file. No past surgical history on file. Social History     Tobacco Use    Smoking status: Not on file   Substance Use Topics    Alcohol use: Not on file        No family history on file. No Known Allergies     Prior to Admission medications    Not on File       REVIEW OF SYSTEMS:     I am not able to complete the review of systems because:    The patient is intubated and sedated    The patient has altered mental status due to his acute medical problems    The patient has baseline aphasia from prior stroke(s)    The patient has baseline dementia and is not reliable historian    The patient is in acute medical distress and unable to provide information           Total of 12 systems reviewed as follows:       POSITIVE= underlined text  Negative = text not underlined  General:  fever, chills, sweats, generalized weakness, weight loss/gain,      loss of appetite   Eyes:    blurred vision, eye pain, loss of vision, double vision  ENT:    rhinorrhea, pharyngitis   Respiratory:   cough, sputum production, SOB, MERRITT, wheezing, pleuritic pain   Cardiology:   chest pain, palpitations, orthopnea, PND, edema, syncope   Gastrointestinal:  abdominal pain , N/V, diarrhea, dysphagia, constipation, bleeding   Genitourinary:  frequency, urgency, dysuria, hematuria, incontinence   Muskuloskeletal :  arthralgia, myalgia, back pain  Hematology:  easy bruising, nose or gum bleeding, lymphadenopathy   Dermatological: rash, ulceration, pruritis, color change / jaundice  Endocrine:   hot flashes or polydipsia   Neurological:  headache, dizziness, confusion, focal weakness, paresthesia,     Speech difficulties, memory loss, gait difficulty  Psychological: Feelings of anxiety, depression, agitation    Objective:   VITALS:    Visit Vitals  /78   Pulse (!) 106   Temp 98 °F (36.7 °C)   Resp 21   Ht 5' 4\" (1.626 m)   Wt 108.9 kg (240 lb)   SpO2 100%   BMI 41.20 kg/m²       PHYSICAL EXAM:    General:    No distress under BiPAP  HEENT: Atraumatic, anicteric sclerae, pink conjunctivae     No oral ulcers, mucosa moist, throat clear, dentition fair  Neck:  Supple, symmetrical,  thyroid: non tender  Lungs:   Clear to auscultation bilaterally. No Wheezing or Rhonchi. No rales. Chest wall:  No tenderness  No Accessory muscle use. Heart:   Regular  rhythm,  No  murmur   No edema  Abdomen:   Soft, non-tender. Not distended. Bowel sounds normal  Extremities: No cyanosis. No clubbing,      Skin turgor normal, Capillary refill normal, Radial dial pulse 2+  Skin:     Not pale. Not Jaundiced  No rashes   Psych:  Good insight. Not depressed. Not anxious or agitated. Neurologic: EOMs intact. No facial asymmetry. No aphasia or slurred speech. Symmetrical strength, Sensation grossly intact.  Alert and oriented X 4.     _______________________________________________________________________  Care Plan discussed with:    Comments   Patient     Family      RN     Care Manager                    Consultant:      _______________________________________________________________________  Expected  Disposition:   Home with Family    HH/PT/OT/RN    SNF/LTC    NELLI    ________________________________________________________________________  TOTAL TIME: 40 minutes    Critical Care Provided     Minutes non procedure based      Comments     Reviewed previous records   >50% of visit spent in counseling and coordination of care  Discussion with patient and/or family and questions answered       ________________________________________________________________________  Signed: Giuseppe Silveira MD    Procedures: see electronic medical records for all procedures/Xrays and details which were not copied into this note but were reviewed prior to creation of Plan. LAB DATA REVIEWED:    Recent Results (from the past 24 hour(s))   EKG, 12 LEAD, INITIAL    Collection Time: 07/17/20  4:51 AM   Result Value Ref Range    Ventricular Rate 121 BPM    Atrial Rate 121 BPM    P-R Interval 148 ms    QRS Duration 82 ms    Q-T Interval 322 ms    QTC Calculation (Bezet) 457 ms    Calculated P Axis 73 degrees    Calculated R Axis 56 degrees    Calculated T Axis 79 degrees    Diagnosis       Sinus tachycardia  Possible Left atrial enlargement  No previous ECGs available     CBC WITH AUTOMATED DIFF    Collection Time: 07/17/20  5:00 AM   Result Value Ref Range    WBC 11.6 (H) 3.6 - 11.0 K/uL    RBC 4.29 3.80 - 5.20 M/uL    HGB 11.9 11.5 - 16.0 g/dL    HCT 37.9 35.0 - 47.0 %    MCV 88.3 80.0 - 99.0 FL    MCH 27.7 26.0 - 34.0 PG    MCHC 31.4 30.0 - 36.5 g/dL    RDW 12.7 11.5 - 14.5 %    PLATELET 614 103 - 636 K/uL    MPV 10.0 8.9 - 12.9 FL    NRBC 0.0 0  WBC    ABSOLUTE NRBC 0.00 0.00 - 0.01 K/uL    NEUTROPHILS 58 32 - 75 %    LYMPHOCYTES 36 12 - 49 %    MONOCYTES 4 (L) 5 - 13 %    EOSINOPHILS 2 0 - 7 %    BASOPHILS 0 0 - 1 %    IMMATURE GRANULOCYTES 0 0.0 - 0.5 %    ABS. NEUTROPHILS 6.7 1.8 - 8.0 K/UL    ABS. LYMPHOCYTES 4.1 (H) 0.8 - 3.5 K/UL    ABS. MONOCYTES 0.5 0.0 - 1.0 K/UL    ABS. EOSINOPHILS 0.2 0.0 - 0.4 K/UL    ABS. BASOPHILS 0.1 0.0 - 0.1 K/UL    ABS. IMM.  GRANS. 0.0 0.00 - 0.04 K/UL    DF AUTOMATED     METABOLIC PANEL, COMPREHENSIVE    Collection Time: 07/17/20  5:00 AM   Result Value Ref Range    Sodium 143 136 - 145 mmol/L    Potassium 3.7 3.5 - 5.1 mmol/L    Chloride 108 97 - 108 mmol/L    CO2 25 21 - 32 mmol/L    Anion gap 10 5 - 15 mmol/L    Glucose 221 (H) 65 - 100 mg/dL    BUN 24 (H) 6 - 20 MG/DL    Creatinine 1.97 (H) 0.55 - 1.02 MG/DL    BUN/Creatinine ratio 12 12 - 20      GFR est AA 32 (L) >60 ml/min/1.73m2    GFR est non-AA 26 (L) >60 ml/min/1.73m2    Calcium 8.9 8.5 - 10.1 MG/DL    Bilirubin, total 0.2 0.2 - 1.0 MG/DL    ALT (SGPT) 24 12 - 78 U/L    AST (SGOT) 24 15 - 37 U/L    Alk. phosphatase 120 (H) 45 - 117 U/L    Protein, total 8.0 6.4 - 8.2 g/dL    Albumin 3.0 (L) 3.5 - 5.0 g/dL    Globulin 5.0 (H) 2.0 - 4.0 g/dL    A-G Ratio 0.6 (L) 1.1 - 2.2     CK W/ REFLX CKMB    Collection Time: 07/17/20  5:00 AM   Result Value Ref Range     (H) 26 - 192 U/L   TROPONIN I    Collection Time: 07/17/20  5:00 AM   Result Value Ref Range    Troponin-I, Qt. <0.05 <0.05 ng/mL   NT-PRO BNP    Collection Time: 07/17/20  5:00 AM   Result Value Ref Range    NT pro-BNP 2,600 (H) <125 PG/ML   CK-MB,QUANT.     Collection Time: 07/17/20  5:00 AM   Result Value Ref Range    CK - MB 2.2 <3.6 NG/ML    CK-MB Index 0.3 0.0 - 2.5

## 2020-07-17 NOTE — PROGRESS NOTES
Spiritual Care Assessment/Progress Note  [facility]      NAME: Emma Macedo      MRN: [id number]  AGE: 63 y.o.  SEX: female  Hindu Affiliation: No Holiness   Language: English     7/17/2020     Total Time (in minutes): 30     Spiritual Assessment begun in [facility] EMERGENCY DEPT through conversation with:         [x]Patient        [] Family    [] Friend(s)        Reason for Consult: Request by staff     Spiritual beliefs: (Please include comment if needed)     [x] Identifies with a shelly tradition:         [] Supported by a shelly community:            [] Claims no spiritual orientation:           [] Seeking spiritual identity:                [] Adheres to an individual form of spirituality:           [] Not able to assess:                           Identified resources for coping:      [x] Prayer                               [] Music                  [] Guided Imagery     [x] Family/friends                 [] Pet visits     [] Devotional reading                         [] Unknown     [] Other:                                              Interventions offered during this visit: (See comments for more details)    Patient Interventions: Affirmation of emotions/emotional suffering, Affirmation of shelly, Coping skills reviewed/reinforced, Iconic (affirming the presence of God/Higher Power), Normalization of emotional/spiritual concerns, Prayer (assurance of)           Plan of Care:     [] Support spiritual and/or cultural needs    [] Support AMD and/or advance care planning process      [] Support grieving process   [] Coordinate Rites and/or Rituals    [] Coordination with community clergy   [] No spiritual needs identified at this time   [] Detailed Plan of Care below (See Comments)  [] Make referral to Music Therapy  [] Make referral to Pet Therapy     [] Make referral to Addiction services  [] Make referral to [facility]  [] Make referral to Spiritual Care Partner  [] No future visits requested        [x] Follow up visits as needed     Visited pt in ER in response to a Spiritual Care Order. Pt is a Restorationism Kevin and her shelly is an important source of support to her. She lives in [place], [place] and is in the area visiting her son who lives locally. She has a daughter who lives close to her. The daughter is getting  in early August and the pt will be officiating. Assured her of ongoing  support as needed.    Chaplain Mac, MDiv, MS, [employer]  [address] [place] [address] [phone])

## 2020-07-18 VITALS
RESPIRATION RATE: 18 BRPM | TEMPERATURE: 97.8 F | SYSTOLIC BLOOD PRESSURE: 138 MMHG | DIASTOLIC BLOOD PRESSURE: 72 MMHG | WEIGHT: 240.08 LBS | HEIGHT: 64 IN | OXYGEN SATURATION: 97 % | HEART RATE: 90 BPM | BODY MASS INDEX: 40.99 KG/M2

## 2020-07-18 PROBLEM — J81.1 PULMONARY EDEMA: Status: RESOLVED | Noted: 2020-07-17 | Resolved: 2020-07-18

## 2020-07-18 LAB
ANION GAP SERPL CALC-SCNC: 6 MMOL/L (ref 5–15)
BASOPHILS # BLD: 0 K/UL (ref 0–0.1)
BASOPHILS NFR BLD: 1 % (ref 0–1)
BUN SERPL-MCNC: 29 MG/DL (ref 6–20)
BUN/CREAT SERPL: 14 (ref 12–20)
CALCIUM SERPL-MCNC: 8.7 MG/DL (ref 8.5–10.1)
CHLORIDE SERPL-SCNC: 108 MMOL/L (ref 97–108)
CHOLEST SERPL-MCNC: 197 MG/DL
CO2 SERPL-SCNC: 27 MMOL/L (ref 21–32)
CREAT SERPL-MCNC: 2.1 MG/DL (ref 0.55–1.02)
DIFFERENTIAL METHOD BLD: ABNORMAL
EOSINOPHIL # BLD: 0.1 K/UL (ref 0–0.4)
EOSINOPHIL NFR BLD: 2 % (ref 0–7)
ERYTHROCYTE [DISTWIDTH] IN BLOOD BY AUTOMATED COUNT: 12.7 % (ref 11.5–14.5)
GLUCOSE BLD STRIP.AUTO-MCNC: 186 MG/DL (ref 65–100)
GLUCOSE SERPL-MCNC: 166 MG/DL (ref 65–100)
HCT VFR BLD AUTO: 29.4 % (ref 35–47)
HDLC SERPL-MCNC: 69 MG/DL
HDLC SERPL: 2.9 {RATIO} (ref 0–5)
HGB BLD-MCNC: 9.6 G/DL (ref 11.5–16)
IMM GRANULOCYTES # BLD AUTO: 0 K/UL (ref 0–0.04)
IMM GRANULOCYTES NFR BLD AUTO: 0 % (ref 0–0.5)
LDLC SERPL CALC-MCNC: 109 MG/DL (ref 0–100)
LIPID PROFILE,FLP: ABNORMAL
LYMPHOCYTES # BLD: 2.5 K/UL (ref 0.8–3.5)
LYMPHOCYTES NFR BLD: 32 % (ref 12–49)
MCH RBC QN AUTO: 28.2 PG (ref 26–34)
MCHC RBC AUTO-ENTMCNC: 32.7 G/DL (ref 30–36.5)
MCV RBC AUTO: 86.2 FL (ref 80–99)
MONOCYTES # BLD: 0.4 K/UL (ref 0–1)
MONOCYTES NFR BLD: 5 % (ref 5–13)
NEUTS SEG # BLD: 4.7 K/UL (ref 1.8–8)
NEUTS SEG NFR BLD: 60 % (ref 32–75)
NRBC # BLD: 0 K/UL (ref 0–0.01)
NRBC BLD-RTO: 0 PER 100 WBC
PLATELET # BLD AUTO: 225 K/UL (ref 150–400)
PMV BLD AUTO: 10.2 FL (ref 8.9–12.9)
POTASSIUM SERPL-SCNC: 3.9 MMOL/L (ref 3.5–5.1)
RBC # BLD AUTO: 3.41 M/UL (ref 3.8–5.2)
SERVICE CMNT-IMP: ABNORMAL
SODIUM SERPL-SCNC: 141 MMOL/L (ref 136–145)
TRIGL SERPL-MCNC: 95 MG/DL (ref ?–150)
VLDLC SERPL CALC-MCNC: 19 MG/DL
WBC # BLD AUTO: 7.7 K/UL (ref 3.6–11)

## 2020-07-18 PROCEDURE — 82962 GLUCOSE BLOOD TEST: CPT

## 2020-07-18 PROCEDURE — 85025 COMPLETE CBC W/AUTO DIFF WBC: CPT

## 2020-07-18 PROCEDURE — 74011250637 HC RX REV CODE- 250/637: Performed by: INTERNAL MEDICINE

## 2020-07-18 PROCEDURE — 74011636637 HC RX REV CODE- 636/637: Performed by: FAMILY MEDICINE

## 2020-07-18 PROCEDURE — 80048 BASIC METABOLIC PNL TOTAL CA: CPT

## 2020-07-18 PROCEDURE — 36415 COLL VENOUS BLD VENIPUNCTURE: CPT

## 2020-07-18 PROCEDURE — 80061 LIPID PANEL: CPT

## 2020-07-18 PROCEDURE — 74011250637 HC RX REV CODE- 250/637: Performed by: FAMILY MEDICINE

## 2020-07-18 RX ADMIN — CARVEDILOL 25 MG: 12.5 TABLET, FILM COATED ORAL at 09:05

## 2020-07-18 RX ADMIN — LISINOPRIL 10 MG: 5 TABLET ORAL at 09:04

## 2020-07-18 RX ADMIN — INSULIN LISPRO 2 UNITS: 100 INJECTION, SOLUTION INTRAVENOUS; SUBCUTANEOUS at 09:07

## 2020-07-18 RX ADMIN — Medication 10 ML: at 05:36

## 2020-07-18 NOTE — PROGRESS NOTES
Problem: Falls - Risk of  Goal: *Absence of Falls  Description: Document Rosi Vazquez Fall Risk and appropriate interventions in the flowsheet. Outcome: Progressing Towards Goal  Note: Fall Risk Interventions:            Medication Interventions: Patient to call before getting OOB, Bed/chair exit alarm    Elimination Interventions: Call light in reach, Bed/chair exit alarm              Problem: Patient Education: Go to Patient Education Activity  Goal: Patient/Family Education  Outcome: Progressing Towards Goal     Problem: Diabetes Self-Management  Goal: *Disease process and treatment process  Description: Define diabetes and identify own type of diabetes; list 3 options for treating diabetes. Outcome: Progressing Towards Goal  Goal: *Incorporating nutritional management into lifestyle  Description: Describe effect of type, amount and timing of food on blood glucose; list 3 methods for planning meals. Outcome: Progressing Towards Goal  Goal: *Incorporating physical activity into lifestyle  Description: State effect of exercise on blood glucose levels. Outcome: Progressing Towards Goal  Goal: *Developing strategies to promote health/change behavior  Description: Define the ABC's of diabetes; identify appropriate screenings, schedule and personal plan for screenings. Outcome: Progressing Towards Goal  Goal: *Using medications safely  Description: State effect of diabetes medications on diabetes; name diabetes medication taking, action and side effects. Outcome: Progressing Towards Goal  Goal: *Monitoring blood glucose, interpreting and using results  Description: Identify recommended blood glucose targets  and personal targets. Outcome: Progressing Towards Goal  Goal: *Prevention, detection, treatment of acute complications  Description: List symptoms of hyper- and hypoglycemia; describe how to treat low blood sugar and actions for lowering  high blood glucose level.   Outcome: Progressing Towards Goal  Goal: *Prevention, detection and treatment of chronic complications  Description: Define the natural course of diabetes and describe the relationship of blood glucose levels to long term complications of diabetes.   Outcome: Progressing Towards Goal  Goal: *Developing strategies to address psychosocial issues  Description: Describe feelings about living with diabetes; identify support needed and support network  Outcome: Progressing Towards Goal  Goal: *Insulin pump training  Outcome: Progressing Towards Goal  Goal: *Sick day guidelines  Outcome: Progressing Towards Goal  Goal: *Patient Specific Goal (EDIT GOAL, INSERT TEXT)  Outcome: Progressing Towards Goal     Problem: Patient Education: Go to Patient Education Activity  Goal: Patient/Family Education  Outcome: Progressing Towards Goal

## 2020-07-18 NOTE — DISCHARGE SUMMARY
Hospitalist Discharge Summary     Patient ID:  Mary Rodriguez  [id number]  38 y.o.  1960 7/17/2020    PCP on record: Other, MD Michael    Admit date: 7/17/2020  Discharge date and time: 7/18/2020    DISCHARGE DIAGNOSIS:  Acute resp failure from CHF   Acute on Chr zackery CHF ex ef 35%  DM ty with hyperglycemia  CKD stg 3  HTN uncontrolled on admit    CONSULTATIONS:  IP CONSULT TO HOSPITALIST  IP CONSULT TO CARDIOLOGY    Excerpted HPI from H&P of Davida Worthington MD:    Skyler Guo is a 63 y.o.  female who presents with acute onset shortness of breath. Patient states diagnosed with congestive heart failure, in 2019. His cardiologist in [facility] [place]. She was taking spironolactone, Lasix, carvedilol. Last week decided to come to visit her son did not bring her medications. She was rattling for last 2 3 days. Early this morning woke up to take a shower and drive back to [place]. Became severely short of breath. Her son drove her here. Patient denies contact with COVID positive patient. Denies fever or chills. No chest pain     ER course: Upon arrival patient hypertensive, hypoxic respiratory failure. Placed on BiPAP, Lasix given started on NTG drip, significant improvement, hospitalist consulted for admission. Labs showed acute kidney injury, hyperglycemia, elevated proBNP negative troponin.     We were asked to admit for work up and evaluation of the above problems. ______________________________________________________________________  DISCHARGE SUMMARY/HOSPITAL COURSE:  for full details see H&P, daily progress notes, labs, consult notes. Patient 56-year-old female admitted for acute hypoxic respiratory failure due to congestive heart failure with acute exacerbation. 1.  Acute hypoxic respiratory failure; patient with history of nonischemic cardiomyopathy, did not have access to her medication for last several days.   Upon arrival hypoxic hypertensive, required BiPAP, given Lasix, more stablenow off bipapa, off O2 and ambulatd in room. S/p  nitroglycerin drip, s/p IV Lasix cont po lasix at home as before cont home meds  beta-blocker, echocardiogram ef 35%, cardiology saw patient and recommend f/u with her cardiologist in [address] [place] [address] as OP. 2.  Diabetes with hyperglycemia:  Resolved  Cont home meds and f/u pcp Monday next week. She is reluctant to take insulin or new meds at this time until she sees her PCP  3. Hypertension: stable resume home medication  4. CKD stg 3 cont to monitor bmp at pcp office and f/u renal as OP      _______________________________________________________________________  Patient seen and examined by me on discharge day. She maximized inpatient stay and eager to go home. No other acute issues and medically stable for discharge. Patient agreed and verbalized understanding of discharge plan and instructions. Pertinent Findings:  Gen:    Not in distress  Chest: Clear lungs  CVS:   Regular rhythm. No edema  Abd:  Soft, not distended, not tender  Neuro:  Alert  ______________________________________________________________________  DISCHARGE MEDICATIONS:   Current Discharge Medication List      CONTINUE these medications which have NOT CHANGED    Details   lisinopriL (PRINIVIL, ZESTRIL) 40 mg tablet Take 40 mg by mouth daily. carvediloL (COREG) 6.25 mg tablet Take 6.25 mg by mouth two (2) times daily (with meals). furosemide (LASIX) 40 mg tablet Take 40 mg by mouth daily. glipiZIDE (GLUCOTROL) 10 mg tablet Take 10 mg by mouth two (2) times a day. spironolactone (ALDACTONE) 25 mg tablet Take 25 mg by mouth daily.                Patient Follow Up Instructions:   Diet diabetic/low salt/fluid restrict 1500cc/24hrs  Activity slowly increase as tolerated to levels before  Accu checks qac and hs call PCP if <70 or >299  Check cbc/bmp next week at pcp office  Return to ER or call PCP immediately if symptoms gets worse or re-occur.   F/u pcp next week Monday  F/u cardiology 1-2 weeks  F/u nephrology 2-4 weeks    Follow-up Information     Follow up With Specialties Details Why Contact Info    PCP next week monday        cardiology 1-2 weeks        Nephrology 2-3 weeks            ________________________________________________________________    Condition at Discharge:  Stable  __________________________________________________________________    Disposition home   ____________________________________________________________________    Code Status:  full  ___________________________________________________________________      Total time in minutes spent coordinating this discharge (includes going over instructions, follow-up, prescriptions) :  31 minutes    Signed:  Irineo Nolasco MD

## 2020-07-18 NOTE — DISCHARGE INSTRUCTIONS
Diet diabetic/low salt/fluid restrict 1500cc/24hrs  Activity slowly increase as tolerated to levels before  Accu checks qac and hs call PCP if <70 or >299  Check cbc/bmp next week at pcp office  Return to ER or call PCP immediately if symptoms gets worse or re-occur.   F/u pcp next week Monday  F/u cardiology 1-2 weeks  F/u nephrology 2-4 weeks

## 2020-07-20 ENCOUNTER — PATIENT OUTREACH (OUTPATIENT)
Dept: CASE MANAGEMENT | Age: 60
End: 2020-07-20